# Patient Record
Sex: MALE | Race: BLACK OR AFRICAN AMERICAN | Employment: STUDENT | URBAN - METROPOLITAN AREA
[De-identification: names, ages, dates, MRNs, and addresses within clinical notes are randomized per-mention and may not be internally consistent; named-entity substitution may affect disease eponyms.]

---
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
## 2025-04-01 NOTE — PROGRESS NOTES
Patient is here with parent/guardian for a yearly physical exam.   Patient presents with:  Stomach Pain: started this morning, he states its more towards the middle. he states he has not had this feeling before. denies any nausea/vomitin, diarrhea.  Pain ri urticaria, hay fever, allergic rhinitis, asthma, eczema, drug reactions      PHYSICAL EXAM:   Normal vital signs, see nursing notes    General Appearance: appears healthy, well nourished,  in no acute distress  Head: normocephalic, atraumatic  Eyes: RASHIDA, counseled regarding abdominal pain. I suspect it could be either muscular in nature or related to acidity. Trial of ranitidine 75 mg twice a day as needed. If not better please RTC.   Also will hold off on second meningococcal vaccine until he is a bonnie 01-Apr-2025 19:11

## (undated) NOTE — Clinical Note
Date: 4/4/2017    Patient Name: Abiola Mancuso          To Whom it may concern:    Raisa Rao should be excused from gym class and no strenuous activities until evaluation completed for a medical issue being addressed.     Tentative duration: 3/18/17 - 5/09/17 but

## (undated) NOTE — Clinical Note
Date: 6/9/2017    Patient Name: Ray Workman          To Whom it may concern:      Robyn Whalen may return to gym and sports without any restrictions on 6/9/17.          Sincerely,    Milan Galvez MD

## (undated) NOTE — Clinical Note
Date: 3/18/2017    Patient Name: Jeremy Nissen          To Whom it may concern:    Alycia Romo should be excused from gym class and no strenuous activities until evaluation completed for a medical issue being addressed.      Tentative duration: 3/18/17 - 4/8/17  b

## (undated) NOTE — LETTER
Date: 1/22/2019    Patient Name: Alberto Mendez          To Whom it may concern: This letter has been written at the patient's request. The above patient was seen at the [facility] for treatment of a medical condition.     The patient maybe

## (undated) NOTE — LETTER
Date: 1/24/2019    Patient Name: Mariposa Love          To Whom it may concern:      Please excuse patient from work from 1/24/19 - 2/11/19 due to a medical condition that is being evaluated and treated at this time.            Sincerely,    Romelia Jeffrey,

## (undated) NOTE — LETTER
Date: 2/13/2019    Patient Name: Zuly Estevez          To Whom it may concern: This letter has been written at the patient's request. The above patient was seen at the [facility] for treatment of a medical condition.     This patient shoul

## (undated) NOTE — Clinical Note
Date: 3/24/2017    Patient Name: Feli Spencer          To Whom it may concern:    Percy Kirby should be excused from gym class and no strenuous activities until evaluation completed for a medical issue being addressed.      Tentative duration: 3/18/17 - 4/9/17  b

## (undated) NOTE — MR AVS SNAPSHOT
[facility] [address] [place] [address] [address] [facility]  Demetrio Tejada [id number] [phone]               Thank you for choosing us for your health care visit with Luis Christianson MD.  We are glad to serve you and happy to provide you wit numbers can create reimbursement difficulties for you.     Assoc Dx:  Cardiac arrhythmia, unspecified cardiac arrhythmia type [I49.9]          Reason for Today's Visit     Well Child           Medical Issues Discussed Today     Cardiac arrhythmia      Instr CO2 29.0 22.0-32.0 mmol/L                TSH+FREE T4      Component Value Standard Range & Units    Free T4 1.0 0.9-1.8 ng/dL    TSH 0.776 0.350-5.500 mIU/mL                EKG 12-LEAD      Component Value Standard Range & Units    Ventricular rate 64 BPM

## (undated) NOTE — ED AVS SNAPSHOT
Collette Mi   MRN: [id number]    Department:  [facility] in [place]   Date of Visit:  1/20/2018           Disclosure     Insurance plans vary and the physician(s) referred by the ER may not be covered by your plan.  Please contact tell this physician (or your personal doctor if your instructions are to return to your personal doctor) about any new or lasting problems. The primary care or specialist physician will see patients referred from the [facility] Emergency Department.  Dinorah Hernandez